# Patient Record
Sex: FEMALE | Race: BLACK OR AFRICAN AMERICAN | NOT HISPANIC OR LATINO | Employment: FULL TIME | URBAN - METROPOLITAN AREA
[De-identification: names, ages, dates, MRNs, and addresses within clinical notes are randomized per-mention and may not be internally consistent; named-entity substitution may affect disease eponyms.]

---

## 2023-10-19 PROBLEM — R87.610 ATYPICAL SQUAMOUS CELLS OF UNDETERMINED SIGNIFICANCE ON CYTOLOGIC SMEAR OF CERVIX (ASC-US): Status: ACTIVE | Noted: 2023-10-19

## 2023-10-19 PROBLEM — J45.998 POST VIRAL RAD (REACTIVE AIRWAY DISEASE) (HHS-HCC): Status: ACTIVE | Noted: 2023-10-19

## 2023-10-19 PROBLEM — E66.3 OVERWEIGHT: Status: ACTIVE | Noted: 2023-10-19

## 2023-10-19 PROBLEM — R05.3 CHRONIC COUGH: Status: ACTIVE | Noted: 2023-10-19

## 2023-10-19 PROBLEM — E55.9 VITAMIN D DEFICIENCY: Status: ACTIVE | Noted: 2023-10-19

## 2023-10-19 RX ORDER — ALBUTEROL SULFATE 90 UG/1
2 AEROSOL, METERED RESPIRATORY (INHALATION) 4 TIMES DAILY PRN
COMMUNITY
Start: 2022-01-04

## 2023-10-19 RX ORDER — MEDROXYPROGESTERONE ACETATE 150 MG/ML
INJECTION, SUSPENSION INTRAMUSCULAR
COMMUNITY
Start: 2021-10-01

## 2023-10-19 RX ORDER — CETIRIZINE HYDROCHLORIDE 10 MG/1
1 TABLET ORAL DAILY
COMMUNITY
Start: 2022-01-26

## 2023-10-19 RX ORDER — BENZONATATE 100 MG/1
1 CAPSULE ORAL 3 TIMES DAILY PRN
COMMUNITY
Start: 2022-01-26 | End: 2024-04-05 | Stop reason: ALTCHOICE

## 2023-10-20 ENCOUNTER — APPOINTMENT (OUTPATIENT)
Dept: OBSTETRICS AND GYNECOLOGY | Facility: HOSPITAL | Age: 43
End: 2023-10-20
Payer: COMMERCIAL

## 2023-10-20 ENCOUNTER — CLINICAL SUPPORT (OUTPATIENT)
Dept: PRIMARY CARE | Facility: CLINIC | Age: 43
End: 2023-10-20
Payer: COMMERCIAL

## 2023-10-20 VITALS
OXYGEN SATURATION: 99 % | DIASTOLIC BLOOD PRESSURE: 83 MMHG | SYSTOLIC BLOOD PRESSURE: 118 MMHG | TEMPERATURE: 98.3 F | HEART RATE: 79 BPM

## 2023-10-20 DIAGNOSIS — Z30.8 ENCOUNTER FOR OTHER CONTRACEPTIVE MANAGEMENT: Primary | ICD-10-CM

## 2023-10-20 PROCEDURE — 96372 THER/PROPH/DIAG INJ SC/IM: CPT | Performed by: FAMILY MEDICINE

## 2023-10-20 RX ORDER — MEDROXYPROGESTERONE ACETATE 150 MG/ML
150 INJECTION, SUSPENSION INTRAMUSCULAR ONCE
Status: COMPLETED | OUTPATIENT
Start: 2023-10-20 | End: 2023-10-20

## 2023-10-20 RX ADMIN — MEDROXYPROGESTERONE ACETATE 150 MG: 150 INJECTION, SUSPENSION INTRAMUSCULAR at 12:41

## 2023-10-20 NOTE — PROGRESS NOTES
Pt in clinic to receive Depot injection. V/S WNL. Calendar given to pt for next administration window. Administered to L deltoid. Appt with provider scheduled with pt prior to appt end.

## 2023-10-23 ENCOUNTER — APPOINTMENT (OUTPATIENT)
Dept: PRIMARY CARE | Facility: CLINIC | Age: 43
End: 2023-10-23
Payer: COMMERCIAL

## 2024-01-05 ENCOUNTER — OFFICE VISIT (OUTPATIENT)
Dept: PRIMARY CARE | Facility: CLINIC | Age: 44
End: 2024-01-05
Payer: COMMERCIAL

## 2024-01-05 VITALS
OXYGEN SATURATION: 98 % | TEMPERATURE: 97.5 F | HEART RATE: 96 BPM | DIASTOLIC BLOOD PRESSURE: 78 MMHG | BODY MASS INDEX: 31.97 KG/M2 | WEIGHT: 204.1 LBS | SYSTOLIC BLOOD PRESSURE: 108 MMHG

## 2024-01-05 DIAGNOSIS — Z30.42 ENCOUNTER FOR SURVEILLANCE OF INJECTABLE CONTRACEPTIVE: ICD-10-CM

## 2024-01-05 DIAGNOSIS — Z28.21 INFLUENZA VACCINE REFUSED: ICD-10-CM

## 2024-01-05 DIAGNOSIS — Z12.31 ENCOUNTER FOR SCREENING MAMMOGRAM FOR MALIGNANT NEOPLASM OF BREAST: Primary | ICD-10-CM

## 2024-01-05 DIAGNOSIS — Z00.00 ANNUAL PHYSICAL EXAM: ICD-10-CM

## 2024-01-05 PROBLEM — E66.811 CLASS 1 OBESITY DUE TO EXCESS CALORIES WITHOUT SERIOUS COMORBIDITY WITH BODY MASS INDEX (BMI) OF 31.0 TO 31.9 IN ADULT: Status: ACTIVE | Noted: 2023-10-19

## 2024-01-05 PROBLEM — E66.09 CLASS 1 OBESITY DUE TO EXCESS CALORIES WITHOUT SERIOUS COMORBIDITY WITH BODY MASS INDEX (BMI) OF 31.0 TO 31.9 IN ADULT: Status: ACTIVE | Noted: 2023-10-19

## 2024-01-05 LAB — PREGNANCY TEST URINE, POC: NEGATIVE

## 2024-01-05 PROCEDURE — 96372 THER/PROPH/DIAG INJ SC/IM: CPT | Performed by: STUDENT IN AN ORGANIZED HEALTH CARE EDUCATION/TRAINING PROGRAM

## 2024-01-05 PROCEDURE — 99396 PREV VISIT EST AGE 40-64: CPT | Performed by: STUDENT IN AN ORGANIZED HEALTH CARE EDUCATION/TRAINING PROGRAM

## 2024-01-05 PROCEDURE — 90471 IMMUNIZATION ADMIN: CPT | Performed by: STUDENT IN AN ORGANIZED HEALTH CARE EDUCATION/TRAINING PROGRAM

## 2024-01-05 PROCEDURE — 90715 TDAP VACCINE 7 YRS/> IM: CPT | Performed by: STUDENT IN AN ORGANIZED HEALTH CARE EDUCATION/TRAINING PROGRAM

## 2024-01-05 PROCEDURE — 1036F TOBACCO NON-USER: CPT | Performed by: STUDENT IN AN ORGANIZED HEALTH CARE EDUCATION/TRAINING PROGRAM

## 2024-01-05 PROCEDURE — 81025 URINE PREGNANCY TEST: CPT | Performed by: STUDENT IN AN ORGANIZED HEALTH CARE EDUCATION/TRAINING PROGRAM

## 2024-01-05 RX ORDER — MEDROXYPROGESTERONE ACETATE 150 MG/ML
150 INJECTION, SUSPENSION INTRAMUSCULAR ONCE
Status: COMPLETED | OUTPATIENT
Start: 2024-01-05 | End: 2024-01-05

## 2024-01-05 RX ADMIN — MEDROXYPROGESTERONE ACETATE 150 MG: 150 INJECTION, SUSPENSION INTRAMUSCULAR at 16:56

## 2024-01-05 SDOH — ECONOMIC STABILITY: FOOD INSECURITY: WITHIN THE PAST 12 MONTHS, THE FOOD YOU BOUGHT JUST DIDN'T LAST AND YOU DIDN'T HAVE MONEY TO GET MORE.: NEVER TRUE

## 2024-01-05 SDOH — ECONOMIC STABILITY: FOOD INSECURITY: WITHIN THE PAST 12 MONTHS, YOU WORRIED THAT YOUR FOOD WOULD RUN OUT BEFORE YOU GOT MONEY TO BUY MORE.: NEVER TRUE

## 2024-01-05 ASSESSMENT — PATIENT HEALTH QUESTIONNAIRE - PHQ9
2. FEELING DOWN, DEPRESSED OR HOPELESS: NOT AT ALL
1. LITTLE INTEREST OR PLEASURE IN DOING THINGS: NOT AT ALL
SUM OF ALL RESPONSES TO PHQ9 QUESTIONS 1 AND 2: 0

## 2024-01-05 ASSESSMENT — PAIN SCALES - GENERAL: PAINLEVEL: 0-NO PAIN

## 2024-01-05 NOTE — PATIENT INSTRUCTIONS
Dear Rosenda Gaspar,     It was a pleasure getting to manage your care with you today.     Please bring a copy of you [facility] lab results to your next visit.    Diet tips:   1. Eat healthy sources of carbohydrates (whole grains, like brown rice and whole oats; fruit; vegetables; beans and lentils; dairy, like unsweetened yogurt and milk.)   2. Cut down on high carbohydrate, low fiber foods, such as white bread, white rice, and highly processed cereals.   3. Eat less salt.   4. Eat less red and processed meat.   5. Choose healthier fats (unsalted nuts, seeds, avocados, fish, olive oil).   6. Cut down on added sugar (pop, fruit juice, energy drinks).   7. Be smart with snacks. Healthy snacks include: yogurt, nuts, seeds, fruits, vegetables  8. Drink alcohol as little as possible, and no more than 7 drinks in a week. It's not healthy at any amount.     Exercise: Aim for at least 150 minutes of moderate intensity activity per week (30 minutes, 5 times per week). This includes any activity that raises your heart rate, makes you breath faster, and feel warmer. You should be able to talk and only be slightly out of breath.     Follow up Appointment: 3 months for pap and next Depo    Emergency  In the case of an emergency please call 911 or visit the Emergency Department immediately for evaluation.     We look forward to continuing your care here at our Clinic. Take Care.     Sincerely,   Kali Douglas MD

## 2024-01-05 NOTE — PROGRESS NOTES
Subjective   Patient ID: Rosenda Gaspar is a 43 y.o. female with no significant PMH who presents for Contraception. It has been 1 year since last physical, so visit was converted into annual physical visit.    # Health Maintenance  - [facility]  - Patient's rating of their own health: Good  - Dental Care: last prior dental visit - about 1 year ago // brushes teeth - yes // denies current tooth pain  - Vision: last prior ophtho visit - March 2023 // corrective devices: glasses // recent vision: no concerns  - Hearing: denies recent hearing loss  - Diet: thinks it's pretty healthy  - Exercise: going to a gym, treadmill  - Weight: stable  - Smoking: never a smoker  - EtOH: Alcohol Use: Yes, patient drinks alcohol. Frequency: rarely, socially. Amount: 2 cups in a month.  - Illicit substances: denied.  - Employment: [employer]  - Living Situation: Single in her own house  - Colon CA: family h/o colon ca? No    WOMEN  - Menstrual Status: Does not get periods while on Depo  - LMP whenever she missed Depo last  - Pregnancy history: G2  - Sexual History: Sexually active with 1 male partner  - Prior forms of contraception tried: DepoProvera  - Content with current contraception? Yes; has been on it for 25 years and stable  - Sexual dysfunction? No  - Bladder dysfunction? has not had any episodes of incontinence  - Cervical CA: last prior pap 5/15/2019 // h/o abnormal pap? Yes, describe: HPV negative with ASCUS  - Breast CA: last prior mammo never // mother has a history of breast cancer diagnosed at age 48    PHQ2:  Over the past 2 weeks, how often have you been bothered by any of the following problems?  Little interest or pleasure in doing things: Not at all  Feeling down, depressed, or hopeless: Not at all    Food insecurity:  Food Insecurity: No Food Insecurity (1/5/2024)    Hunger Vital Sign     Worried About Running Out of Food in the Last Year: Never true     Ran Out of Food in the  Last Year: Never true     Review of Systems  As above in HPI    Objective   /78 (BP Location: Right arm, Patient Position: Sitting, BP Cuff Size: Adult)   Pulse 96   Temp 36.4 °C (97.5 °F) (Temporal)   Wt 92.6 kg (204 lb 1.6 oz)   SpO2 98%   BMI 31.97 kg/m²  Body mass index is 31.97 kg/m².    Physical Exam  Vitals reviewed.   Constitutional:       General: She is not in acute distress.     Appearance: Normal appearance.   HENT:      Head: Normocephalic and atraumatic.   Eyes:      Conjunctiva/sclera: Conjunctivae normal.   Pulmonary:      Effort: Pulmonary effort is normal. No respiratory distress.   Skin:     General: Skin is warm and dry.   Neurological:      General: No focal deficit present.      Mental Status: She is alert. Mental status is at baseline.   Psychiatric:         Behavior: Behavior normal.       Assessment/Plan   Rosenda Gaspar is a 44yo female with no PMH who presents for annual physical and contraception management.    Problem List Items Addressed This Visit             ICD-10-CM    Annual physical exam Z00.00     - Flu vaccine: recommended annually, declined  - COVID vaccine: recommended completion of primary series and recommended boosters  - Tdap: done today  - STI screen: not indicated based on risk factors  - Lifetime HIV, HepC: deferred  - Lipid Panel (35M,45F): done for work; will bring in copy of results to next visit  - DM screening: done for work, will bring in copy of results to next visit  - HTN screening: wnl today  - Food Insecurity screen: negative  - Depression: PHQ-2 negative  - Tobacco Cessation: N/A  - Last Dental: recommended follow up every 6mo  - Last Eye exam: recommended follow up annually  - Pap smear (21-65): overdue d/t hx of ASCUS on pap from 2019; will schedule pap for follow up visit  - Breast CA screening: ordered today           Other Visit Diagnoses         Codes    Encounter for screening mammogram for malignant neoplasm of breast    -  Primary Z12.31     Relevant Orders    BI mammo bilateral screening tomosynthesis    Encounter for surveillance of injectable contraceptive     Z30.42    Relevant Medications    medroxyPROGESTERone (Depo-Provera) injection 150 mg (Completed)    Other Relevant Orders    POCT Pregnancy, Urine manually resulted (Completed)    Influenza vaccine refused     Z28.21        Follow up in 3 months for next Depo and pap test.    Patient discussed with Dr. Tomlinson.    Kali Douglas MD   PGY-3

## 2024-01-06 NOTE — ASSESSMENT & PLAN NOTE
- Flu vaccine: recommended annually, declined  - COVID vaccine: recommended completion of primary series and recommended boosters  - Tdap: done today  - STI screen: not indicated based on risk factors  - Lifetime HIV, HepC: deferred  - Lipid Panel (35M,45F): done for work; will bring in copy of results to next visit  - DM screening: done for work, will bring in copy of results to next visit  - HTN screening: wnl today  - Food Insecurity screen: negative  - Depression: PHQ-2 negative  - Tobacco Cessation: N/A  - Last Dental: recommended follow up every 6mo  - Last Eye exam: recommended follow up annually  - Pap smear (21-65): overdue d/t hx of ASCUS on pap from 2019; will schedule pap for follow up visit  - Breast CA screening: ordered today

## 2024-01-08 NOTE — PROGRESS NOTES
I reviewed the resident/fellow's documentation and discussed the patient with the resident/fellow. I agree with the resident/fellow's medical decision making as documented in the note.    Elpidio Tomlinson MD

## 2024-01-12 ENCOUNTER — APPOINTMENT (OUTPATIENT)
Dept: PRIMARY CARE | Facility: CLINIC | Age: 44
End: 2024-01-12
Payer: COMMERCIAL

## 2024-04-05 ENCOUNTER — OFFICE VISIT (OUTPATIENT)
Dept: PRIMARY CARE | Facility: CLINIC | Age: 44
End: 2024-04-05
Payer: COMMERCIAL

## 2024-04-05 VITALS
SYSTOLIC BLOOD PRESSURE: 106 MMHG | BODY MASS INDEX: 31.18 KG/M2 | OXYGEN SATURATION: 100 % | DIASTOLIC BLOOD PRESSURE: 73 MMHG | TEMPERATURE: 97 F | WEIGHT: 194 LBS | HEART RATE: 84 BPM | HEIGHT: 66 IN

## 2024-04-05 DIAGNOSIS — R87.610 ATYPICAL SQUAMOUS CELLS OF UNDETERMINED SIGNIFICANCE ON CYTOLOGIC SMEAR OF CERVIX (ASC-US): Primary | ICD-10-CM

## 2024-04-05 DIAGNOSIS — Z30.8 ENCOUNTER FOR OTHER CONTRACEPTIVE MANAGEMENT: ICD-10-CM

## 2024-04-05 PROCEDURE — 88141 CYTOPATH C/V INTERPRET: CPT | Performed by: PATHOLOGY

## 2024-04-05 PROCEDURE — 87624 HPV HI-RISK TYP POOLED RSLT: CPT

## 2024-04-05 PROCEDURE — 88175 CYTOPATH C/V AUTO FLUID REDO: CPT

## 2024-04-05 PROCEDURE — 96372 THER/PROPH/DIAG INJ SC/IM: CPT | Performed by: STUDENT IN AN ORGANIZED HEALTH CARE EDUCATION/TRAINING PROGRAM

## 2024-04-05 PROCEDURE — 99213 OFFICE O/P EST LOW 20 MIN: CPT | Performed by: STUDENT IN AN ORGANIZED HEALTH CARE EDUCATION/TRAINING PROGRAM

## 2024-04-05 RX ORDER — CALCIUM CARBONATE 500(1250)
1 TABLET,CHEWABLE ORAL DAILY
COMMUNITY

## 2024-04-05 RX ORDER — MEDROXYPROGESTERONE ACETATE 150 MG/ML
150 INJECTION, SUSPENSION INTRAMUSCULAR ONCE
Status: COMPLETED | OUTPATIENT
Start: 2024-04-05 | End: 2024-04-05

## 2024-04-05 RX ORDER — FOLIC ACID 0.4 MG
TABLET ORAL EVERY OTHER DAY
COMMUNITY

## 2024-04-05 RX ORDER — CHOLECALCIFEROL (VITAMIN D3) 25 MCG
1000 TABLET ORAL DAILY
COMMUNITY

## 2024-04-05 RX ADMIN — MEDROXYPROGESTERONE ACETATE 150 MG: 150 INJECTION, SUSPENSION INTRAMUSCULAR at 16:51

## 2024-04-05 ASSESSMENT — PAIN SCALES - GENERAL: PAINLEVEL: 0-NO PAIN

## 2024-04-05 NOTE — PATIENT INSTRUCTIONS
Dear Ms. Hubert,    It was a pleasure getting to manage your care with you today.     Follow up Appointment: 3 months for Depo and 9 months for annual physical    Emergency  In the case of an emergency please call 911 or visit the Emergency Department immediately for evaluation.     We look forward to continuing your care here at our Clinic. Take Care.     Sincerely,   Kali Douglas MD

## 2024-04-05 NOTE — PROGRESS NOTES
"Subjective   Patient ID: Rosenda Gaspar is a 44 y.o. female who presents for follow up visit and for pap test.    Patient had annual exam last visit. Denies any new concerns at this time. Is overdue for pap test due to history of ASCUS on prior pap. She is agreeable with plan to complete pap today. Also here for Depo follow up. Was restarted on Depo last visit 3 months ago and had a negative pregnancy test at that time.      Review of Systems  As above in HPI    Objective   /73 (BP Location: Left arm, Patient Position: Sitting, BP Cuff Size: Adult)   Pulse 84   Temp 36.1 °C (97 °F) (Temporal)   Ht 1.676 m (5' 6\")   Wt 88 kg (194 lb)   SpO2 100%   BMI 31.31 kg/m²  Body mass index is 31.31 kg/m².    Physical Exam  Vitals reviewed. Exam conducted with a chaperone present.   Constitutional:       General: She is not in acute distress.     Appearance: Normal appearance.   HENT:      Head: Normocephalic and atraumatic.   Eyes:      Conjunctiva/sclera: Conjunctivae normal.   Pulmonary:      Effort: Pulmonary effort is normal. No respiratory distress.   Genitourinary:     Labia:         Right: No lesion.         Left: No lesion.       Vagina: Vaginal discharge (physiologic) present.      Cervix: No discharge, lesion, erythema or cervical bleeding.   Skin:     General: Skin is warm and dry.   Neurological:      Mental Status: She is alert. Mental status is at baseline.   Psychiatric:         Behavior: Behavior normal.       Assessment/Plan   Rosenda Gaspar is a 45yo female who presents for follow up visit for pap and Depo.    Problem List Items Addressed This Visit             ICD-10-CM    Atypical squamous cells of undetermined significance on cytologic smear of cervix (ASC-US) - Primary R87.610    Relevant Orders    THINPREP PAP TEST    HPV DNA High Risk With Genotype     Other Visit Diagnoses         Codes    Encounter for other contraceptive management     Z30.8    Relevant Medications    " medroxyPROGESTERone (Depo-Provera) injection 150 mg (Completed)        Follow up in 3 months for next Depo at nurse visit and in 9 months for annual physical.    Patient discussed with Dr. Tomlinson.    Kali Douglas MD   PGY-3

## 2024-04-08 NOTE — ASSESSMENT & PLAN NOTE
-history of HPV positive ASCUS pap in 2019 without follow up  -pap in 2016 w/ LSIL, no associated HPV testing  -normal pap in 2013  -repeat pap today

## 2024-04-17 LAB
CYTOLOGY CMNT CVX/VAG CYTO-IMP: NORMAL
HPV HR 12 DNA GENITAL QL NAA+PROBE: NEGATIVE
HPV HR GENOTYPES PNL CVX NAA+PROBE: NEGATIVE
HPV16 DNA SPEC QL NAA+PROBE: NEGATIVE
HPV18 DNA SPEC QL NAA+PROBE: NEGATIVE
LAB AP HPV GENOTYPE QUESTION: YES
LAB AP HPV HR: NORMAL
LABORATORY COMMENT REPORT: NORMAL
PATH REPORT.TOTAL CANCER: NORMAL

## 2024-04-22 DIAGNOSIS — R87.610 ATYPICAL SQUAMOUS CELLS OF UNDETERMINED SIGNIFICANCE ON CYTOLOGIC SMEAR OF CERVIX (ASC-US): Primary | ICD-10-CM

## 2024-07-02 ENCOUNTER — APPOINTMENT (OUTPATIENT)
Dept: PRIMARY CARE | Facility: CLINIC | Age: 44
End: 2024-07-02
Payer: COMMERCIAL

## 2024-07-02 VITALS — SYSTOLIC BLOOD PRESSURE: 112 MMHG | OXYGEN SATURATION: 98 % | DIASTOLIC BLOOD PRESSURE: 75 MMHG | HEART RATE: 80 BPM

## 2024-07-02 DIAGNOSIS — Z30.49 ENCOUNTER FOR SURVEILLANCE OF OTHER CONTRACEPTIVE: Primary | ICD-10-CM

## 2024-07-02 PROCEDURE — 96372 THER/PROPH/DIAG INJ SC/IM: CPT | Performed by: STUDENT IN AN ORGANIZED HEALTH CARE EDUCATION/TRAINING PROGRAM

## 2024-07-02 RX ORDER — MEDROXYPROGESTERONE ACETATE 150 MG/ML
150 INJECTION, SUSPENSION INTRAMUSCULAR ONCE
Status: COMPLETED | OUTPATIENT
Start: 2024-07-02 | End: 2024-07-02

## 2024-07-29 ENCOUNTER — APPOINTMENT (OUTPATIENT)
Dept: PRIMARY CARE | Facility: CLINIC | Age: 44
End: 2024-07-29
Payer: COMMERCIAL

## 2024-07-29 VITALS
BODY MASS INDEX: 30.31 KG/M2 | TEMPERATURE: 97.6 F | OXYGEN SATURATION: 98 % | HEART RATE: 94 BPM | SYSTOLIC BLOOD PRESSURE: 111 MMHG | WEIGHT: 188.6 LBS | HEIGHT: 66 IN | DIASTOLIC BLOOD PRESSURE: 78 MMHG

## 2024-07-29 DIAGNOSIS — R07.9 CHEST PAIN, UNSPECIFIED TYPE: Primary | ICD-10-CM

## 2024-07-29 ASSESSMENT — ENCOUNTER SYMPTOMS
JOINT SWELLING: 0
EYE DISCHARGE: 0
EYE ITCHING: 0
DIFFICULTY URINATING: 0
EYE REDNESS: 0
UNEXPECTED WEIGHT CHANGE: 0
ABDOMINAL DISTENTION: 0
POLYDIPSIA: 0
SHORTNESS OF BREATH: 0
DEPRESSION: 0
SINUS PRESSURE: 0
HEADACHES: 0
FATIGUE: 0
CONFUSION: 0
COUGH: 0
POLYPHAGIA: 0
BLOOD IN STOOL: 0
ADENOPATHY: 0
EYE PAIN: 0
DIZZINESS: 0
ARTHRALGIAS: 0
LIGHT-HEADEDNESS: 0
OCCASIONAL FEELINGS OF UNSTEADINESS: 0
FEVER: 0
DIARRHEA: 0
AGITATION: 0
LOSS OF SENSATION IN FEET: 0
DIAPHORESIS: 0
FLANK PAIN: 0
DYSURIA: 0
CONSTIPATION: 0
PALPITATIONS: 0
ABDOMINAL PAIN: 0
BACK PAIN: 0
CHEST TIGHTNESS: 0
CHILLS: 0

## 2024-07-29 ASSESSMENT — COLUMBIA-SUICIDE SEVERITY RATING SCALE - C-SSRS
6. HAVE YOU EVER DONE ANYTHING, STARTED TO DO ANYTHING, OR PREPARED TO DO ANYTHING TO END YOUR LIFE?: NO
2. HAVE YOU ACTUALLY HAD ANY THOUGHTS OF KILLING YOURSELF?: NO
1. IN THE PAST MONTH, HAVE YOU WISHED YOU WERE DEAD OR WISHED YOU COULD GO TO SLEEP AND NOT WAKE UP?: NO

## 2024-07-29 ASSESSMENT — PATIENT HEALTH QUESTIONNAIRE - PHQ9
1. LITTLE INTEREST OR PLEASURE IN DOING THINGS: NOT AT ALL
2. FEELING DOWN, DEPRESSED OR HOPELESS: NOT AT ALL
SUM OF ALL RESPONSES TO PHQ9 QUESTIONS 1 AND 2: 0

## 2024-07-29 ASSESSMENT — PAIN SCALES - GENERAL: PAINLEVEL: 0-NO PAIN

## 2024-07-29 NOTE — PROGRESS NOTES
Subjective   Patient ID: Rosenda Gaspar is a 44 y.o. female who presents for Follow-up (Pt wants to know if she needs to see heart doc).    Chest Pain   This is a recurrent problem. The current episode started more than 1 month ago. The onset quality is sudden. The problem has been resolved. The pain is present in the substernal region. The pain is at a severity of 9/10. The pain is severe. The quality of the pain is described as sharp. The pain radiates to the lower back (left back). Pertinent negatives include no abdominal pain, back pain, cough, diaphoresis, dizziness, fever, headaches, palpitations or shortness of breath. The pain is aggravated by emotional upset, breathing and exertion. She has tried rest (Baby aspirin) for the symptoms. The treatment provided significant relief. Risk factors include stress.      July 9th chest discomfort started. Had chicken pox July 11. The pain got worse. The pain comes and goes. Pain described as sharp and persistent. It lasted a week. She took aspirin 81 mg daily for 5 days helped. This chest pain was first there 4 months ago and lasted 4 days. Did not treat it at that time and it resolved. This chest pain was worse at night. Describes the pain as sharp pain, pulsating. No pain today. Breathing in hurts. She was given Valtrex for her recent diagnose of chicken pox. She only had itching. Her mother had Shingles 3 weeks before. She couldn't lay on left side. Had dizziness when it occurred. Denies lifting weights or moving heavy boxes. Tried Gas-X, laxative and Doculax thought she was bloated and that it might help. Thinks stress plays a role. Is a  for insurance company.     FH: Mother- pacemaker weak heart ; h/o breast cancer   Social: Exercises 2x per week on treadmill (walks);  on Depo     Review of Systems   Constitutional:  Negative for chills, diaphoresis, fatigue, fever and unexpected weight change.   HENT:  Negative for congestion, ear pain and sinus  "pressure.    Eyes:  Negative for pain, discharge, redness and itching.   Respiratory:  Negative for cough, chest tightness and shortness of breath.    Cardiovascular:  Negative for chest pain, palpitations and leg swelling.   Gastrointestinal:  Negative for abdominal distention, abdominal pain, blood in stool, constipation and diarrhea.   Endocrine: Negative for cold intolerance, heat intolerance, polydipsia and polyphagia.   Genitourinary:  Negative for difficulty urinating, dysuria, flank pain, pelvic pain and urgency.   Musculoskeletal:  Negative for arthralgias, back pain and joint swelling.   Neurological:  Negative for dizziness, syncope, light-headedness and headaches.   Hematological:  Negative for adenopathy.   Psychiatric/Behavioral:  Negative for agitation, behavioral problems and confusion.        Objective   /78 (BP Location: Right arm, Patient Position: Sitting, BP Cuff Size: Adult)   Pulse 94   Temp 36.4 °C (97.6 °F) (Tympanic)   Ht 1.676 m (5' 6\")   Wt 85.5 kg (188 lb 9.6 oz)   SpO2 98%   BMI 30.44 kg/m²     Physical Exam  Vitals and nursing note reviewed.   Constitutional:       General: She is not in acute distress.     Appearance: Normal appearance.   HENT:      Head: Normocephalic and atraumatic.      Nose: Nose normal.      Mouth/Throat:      Mouth: Mucous membranes are moist.   Eyes:      Extraocular Movements: Extraocular movements intact.      Conjunctiva/sclera: Conjunctivae normal.   Cardiovascular:      Rate and Rhythm: Normal rate and regular rhythm.      Heart sounds: Normal heart sounds.      No friction rub.   Pulmonary:      Effort: Pulmonary effort is normal.      Breath sounds: Normal breath sounds.   Abdominal:      General: Bowel sounds are normal. There is no distension.      Palpations: Abdomen is soft.      Tenderness: There is no abdominal tenderness.   Musculoskeletal:         General: Normal range of motion.      Cervical back: Normal range of motion.      Right " lower leg: No edema.      Left lower leg: No edema.   Skin:     General: Skin is warm.      Comments: Healing pustules noted Left anterior leg. No drainage.    Neurological:      General: No focal deficit present.      Mental Status: She is alert and oriented to person, place, and time. Mental status is at baseline.   Psychiatric:         Mood and Affect: Mood normal.         Behavior: Behavior normal.         Thought Content: Thought content normal.         Judgment: Judgment normal.         Assessment/Plan     Rosenda Gaspar is a 44 y.o. female who presents for concern chest pain.  EKG performed in clinic showed NSR which is reassuring. No chest pain today and physical exam unremarkable. No tobacco use and no cardiac risk factors. Etiology of chest pain most likely could have been to due to ?Viral pericarditis secondary to chicken pox. Also on ddx but, less likely because of signs/symptoms and physical exam include costochondritis vs costal muscle injury vs bronchospasm. Plan as follows:    Diagnoses and all orders for this visit:  Chest pain, unspecified type (likely due to viral pericarditis)  -     ECG 12 Lead performed in clinic. NSR   -     Transthoracic Echo (TTE) Complete; Future   Return to clinic         -     In 3 months for follow up chest pain/Echo and/or sooner if needed.    Patient seen and discussed with attending, Dr. Pan.    Nereyda Medellin MD  PGY2 Family Medicine

## 2024-08-01 ENCOUNTER — DOCUMENTATION (OUTPATIENT)
Dept: PRIMARY CARE | Facility: CLINIC | Age: 44
End: 2024-08-01
Payer: COMMERCIAL

## 2024-08-04 NOTE — PROGRESS NOTES
I saw and evaluated the patient. I personally obtained the key and critical portions of the history and physical exam or was physically present for key and critical portions performed by the resident/fellow. I reviewed the resident/fellow's documentation and discussed the patient with the resident/fellow. I agree with the resident/fellow's medical decision making as documented in the note.    Chel Pan MD

## 2024-08-16 ENCOUNTER — HOSPITAL ENCOUNTER (OUTPATIENT)
Dept: CARDIOLOGY | Facility: HOSPITAL | Age: 44
Discharge: HOME | End: 2024-08-16
Payer: COMMERCIAL

## 2024-08-16 DIAGNOSIS — R07.9 CHEST PAIN, UNSPECIFIED TYPE: ICD-10-CM

## 2024-08-16 LAB
AORTIC VALVE MEAN GRADIENT: 3.8 MMHG
AORTIC VALVE PEAK VELOCITY: 1.44 M/S
AV PEAK GRADIENT: 8.3 MMHG
EJECTION FRACTION APICAL 4 CHAMBER: 69.5
EJECTION FRACTION: 64 %
LEFT ATRIUM VOLUME AREA LENGTH INDEX BSA: 24.3 ML/M2
LEFT VENTRICLE INTERNAL DIMENSION DIASTOLE: 4.84 CM (ref 3.5–6)
MITRAL VALVE E/A RATIO: 0.78
RIGHT VENTRICLE FREE WALL PEAK S': 12 CM/S
RIGHT VENTRICLE PEAK SYSTOLIC PRESSURE: 26.4 MMHG
TRICUSPID ANNULAR PLANE SYSTOLIC EXCURSION: 1.9 CM

## 2024-08-16 PROCEDURE — 93306 TTE W/DOPPLER COMPLETE: CPT

## 2024-08-16 PROCEDURE — 93306 TTE W/DOPPLER COMPLETE: CPT | Performed by: INTERNAL MEDICINE

## 2024-09-20 ENCOUNTER — APPOINTMENT (OUTPATIENT)
Dept: PRIMARY CARE | Facility: CLINIC | Age: 44
End: 2024-09-20
Payer: COMMERCIAL

## 2024-09-20 VITALS — DIASTOLIC BLOOD PRESSURE: 78 MMHG | SYSTOLIC BLOOD PRESSURE: 112 MMHG | HEART RATE: 94 BPM

## 2024-09-20 DIAGNOSIS — Z30.8 ENCOUNTER FOR OTHER CONTRACEPTIVE MANAGEMENT: Primary | ICD-10-CM

## 2024-09-20 RX ORDER — MEDROXYPROGESTERONE ACETATE 150 MG/ML
150 INJECTION, SUSPENSION INTRAMUSCULAR ONCE
Status: COMPLETED | OUTPATIENT
Start: 2024-09-20 | End: 2024-09-20

## 2024-12-13 ENCOUNTER — APPOINTMENT (OUTPATIENT)
Dept: PRIMARY CARE | Facility: CLINIC | Age: 44
End: 2024-12-13
Payer: COMMERCIAL

## 2024-12-13 VITALS — DIASTOLIC BLOOD PRESSURE: 72 MMHG | HEART RATE: 80 BPM | SYSTOLIC BLOOD PRESSURE: 109 MMHG

## 2024-12-13 DIAGNOSIS — Z30.8 ENCOUNTER FOR OTHER CONTRACEPTIVE MANAGEMENT: ICD-10-CM

## 2024-12-13 DIAGNOSIS — Z30.42 ENCOUNTER FOR SURVEILLANCE OF INJECTABLE CONTRACEPTIVE: Primary | ICD-10-CM

## 2024-12-13 RX ORDER — MEDROXYPROGESTERONE ACETATE 150 MG/ML
150 INJECTION, SUSPENSION INTRAMUSCULAR ONCE
Status: COMPLETED | OUTPATIENT
Start: 2024-12-13 | End: 2024-12-13

## 2024-12-13 NOTE — PROGRESS NOTES
Pt in clinic for depo. BP WNL. Calendar given for next administration. Administered to deltoid without event via MA.

## 2025-02-28 ENCOUNTER — CLINICAL SUPPORT (OUTPATIENT)
Facility: HOSPITAL | Age: 45
End: 2025-02-28
Payer: COMMERCIAL

## 2025-02-28 VITALS — HEART RATE: 89 BPM | DIASTOLIC BLOOD PRESSURE: 82 MMHG | SYSTOLIC BLOOD PRESSURE: 116 MMHG

## 2025-02-28 DIAGNOSIS — Z30.42 DEPO-PROVERA CONTRACEPTIVE STATUS: Primary | ICD-10-CM

## 2025-02-28 PROCEDURE — 2500000004 HC RX 250 GENERAL PHARMACY W/ HCPCS (ALT 636 FOR OP/ED): Performed by: FAMILY MEDICINE

## 2025-02-28 PROCEDURE — 96372 THER/PROPH/DIAG INJ SC/IM: CPT | Performed by: FAMILY MEDICINE

## 2025-02-28 RX ORDER — MEDROXYPROGESTERONE ACETATE 150 MG/ML
150 INJECTION, SUSPENSION INTRAMUSCULAR ONCE
Status: COMPLETED | OUTPATIENT
Start: 2025-02-28 | End: 2025-02-28

## 2025-02-28 RX ADMIN — MEDROXYPROGESTERONE ACETATE 150 MG: 150 INJECTION, SUSPENSION INTRAMUSCULAR at 16:34

## 2025-02-28 NOTE — PROGRESS NOTES
Pt is here for a depot injection.  Last depot was 12/13/2024 last pregnancy test was negative on 2024.pt was given calendar for next appt

## 2025-02-28 NOTE — PROGRESS NOTES
Pt in clinic for depo. BP was normal. Calendar given for next administration. Administered to left deltoid

## 2025-05-21 ENCOUNTER — TELEPHONE (OUTPATIENT)
Facility: CLINIC | Age: 45
End: 2025-05-21
Payer: COMMERCIAL

## 2025-05-21 NOTE — TELEPHONE ENCOUNTER
This nurse noted pt scheduled for Depo FUV this Friday and is past due for provider annual Depo F/U. Appt changed to provider visit, same date and time. Call placed to pt to make aware. Pt confirms understanding.

## 2025-05-23 ENCOUNTER — TELEPHONE (OUTPATIENT)
Facility: HOSPITAL | Age: 45
End: 2025-05-23

## 2025-05-23 ENCOUNTER — OFFICE VISIT (OUTPATIENT)
Facility: HOSPITAL | Age: 45
End: 2025-05-23
Payer: COMMERCIAL

## 2025-05-23 VITALS
WEIGHT: 200 LBS | BODY MASS INDEX: 31.39 KG/M2 | TEMPERATURE: 97.6 F | HEIGHT: 67 IN | OXYGEN SATURATION: 99 % | DIASTOLIC BLOOD PRESSURE: 71 MMHG | RESPIRATION RATE: 16 BRPM | HEART RATE: 86 BPM | SYSTOLIC BLOOD PRESSURE: 102 MMHG

## 2025-05-23 DIAGNOSIS — Z30.8 ENCOUNTER FOR OTHER CONTRACEPTIVE MANAGEMENT: Primary | ICD-10-CM

## 2025-05-23 DIAGNOSIS — J45.998 OTHER ASTHMA: ICD-10-CM

## 2025-05-23 DIAGNOSIS — J45.909 UNCOMPLICATED ASTHMA, UNSPECIFIED ASTHMA SEVERITY, UNSPECIFIED WHETHER PERSISTENT (HHS-HCC): ICD-10-CM

## 2025-05-23 RX ORDER — MEDROXYPROGESTERONE ACETATE 150 MG/ML
150 INJECTION, SUSPENSION INTRAMUSCULAR
Qty: 1 ML | Refills: 3 | Status: SHIPPED | OUTPATIENT
Start: 2025-05-23

## 2025-05-23 ASSESSMENT — PAIN SCALES - GENERAL: PAINLEVEL_OUTOF10: 0-NO PAIN

## 2025-05-23 NOTE — PROGRESS NOTES
"Subjective   Patient ID: Rosenda Gaspar is a 45 y.o. female who presents for Follow-up (depo).    HPI     46 yo w/ PMHx of seaonal allergies who presents for depo.     # contraception   - on depo since 17 yo   - weight's been stable.   - denies side effects   - denies tobacco use.      Review of Systems  12 point ROS negative except as stated in HPI.     Objective   /71 (BP Location: Left arm, Patient Position: Sitting, BP Cuff Size: Adult)   Pulse 86   Temp 36.4 °C (97.6 °F) (Temporal)   Resp 16   Ht 1.702 m (5' 7\")   Wt 90.7 kg (200 lb)   SpO2 99%   BMI 31.32 kg/m²     Physical Exam  Gen: NAD, nontox  HEENT: NCAT. MMM.  RESP: no resp distress, talks in full sentences, CTABL  CVS: non-tachycardic, RRR  ABD: Soft, non-distended  Psych: appropriately answers questions. normal mood and affect  MSK: appears to have Full range of motion on all extremities.    Assessment/Plan   46 yo w/ no pertinent PMHx who presents for depo injection for contraception. Pt has been on depo since for about 30 years and has not had any side effects with it. Pt is within the window to obtain her injection and denies missed doses. No pregnancy obtained as pt has not missed doses. She was counseled on risk of osteoporosis with depo-provera.     Diagnoses and all orders for this visit:  Encounter for other contraceptive management  -     medroxyPROGESTERone 150 mg/mL injection; Inject 1 mL (150 mg) into the muscle every 12 weeks.    RTC in 3 months for annual follow up.     Seen and discussed with     Wilton Sawyer MD, MPH   Family Medicine and Preventive Health, PGY-3         "

## 2025-08-08 ENCOUNTER — APPOINTMENT (OUTPATIENT)
Dept: OBSTETRICS AND GYNECOLOGY | Facility: HOSPITAL | Age: 45
End: 2025-08-08
Payer: COMMERCIAL

## 2025-08-15 ENCOUNTER — CLINICAL SUPPORT (OUTPATIENT)
Facility: HOSPITAL | Age: 45
End: 2025-08-15
Payer: COMMERCIAL

## 2025-08-15 VITALS — DIASTOLIC BLOOD PRESSURE: 68 MMHG | HEART RATE: 92 BPM | SYSTOLIC BLOOD PRESSURE: 96 MMHG

## 2025-08-15 DIAGNOSIS — Z30.42 ENCOUNTER FOR SURVEILLANCE OF INJECTABLE CONTRACEPTIVE: ICD-10-CM

## 2025-08-15 LAB — PREGNANCY TEST URINE, POC: NEGATIVE

## 2025-08-15 PROCEDURE — 81025 URINE PREGNANCY TEST: CPT | Performed by: STUDENT IN AN ORGANIZED HEALTH CARE EDUCATION/TRAINING PROGRAM

## 2025-08-15 PROCEDURE — 81025 URINE PREGNANCY TEST: CPT

## 2025-08-15 PROCEDURE — 99211 OFF/OP EST MAY X REQ PHY/QHP: CPT

## 2025-08-15 PROCEDURE — 96372 THER/PROPH/DIAG INJ SC/IM: CPT | Performed by: STUDENT IN AN ORGANIZED HEALTH CARE EDUCATION/TRAINING PROGRAM

## 2025-08-15 PROCEDURE — 2500000004 HC RX 250 GENERAL PHARMACY W/ HCPCS (ALT 636 FOR OP/ED): Performed by: STUDENT IN AN ORGANIZED HEALTH CARE EDUCATION/TRAINING PROGRAM

## 2025-08-15 RX ORDER — MEDROXYPROGESTERONE ACETATE 150 MG/ML
150 INJECTION, SUSPENSION INTRAMUSCULAR ONCE
Status: COMPLETED | OUTPATIENT
Start: 2025-08-15 | End: 2025-08-15

## 2025-08-15 RX ADMIN — MEDROXYPROGESTERONE ACETATE 150 MG: 150 INJECTION, SUSPENSION INTRAMUSCULAR at 14:09

## 2025-08-29 ENCOUNTER — APPOINTMENT (OUTPATIENT)
Dept: PRIMARY CARE | Facility: CLINIC | Age: 45
End: 2025-08-29
Payer: COMMERCIAL

## 2025-08-29 VITALS
HEART RATE: 76 BPM | HEIGHT: 67 IN | SYSTOLIC BLOOD PRESSURE: 94 MMHG | DIASTOLIC BLOOD PRESSURE: 66 MMHG | OXYGEN SATURATION: 98 % | BODY MASS INDEX: 29.82 KG/M2 | TEMPERATURE: 97.5 F | WEIGHT: 190 LBS | RESPIRATION RATE: 20 BRPM

## 2025-08-29 DIAGNOSIS — Z00.00 ROUTINE GENERAL MEDICAL EXAMINATION AT A HEALTH CARE FACILITY: Primary | ICD-10-CM

## 2025-08-29 DIAGNOSIS — Z13.1 SCREENING FOR DIABETES MELLITUS: ICD-10-CM

## 2025-08-29 DIAGNOSIS — J45.998 POST VIRAL RAD (REACTIVE AIRWAY DISEASE) (HHS-HCC): ICD-10-CM

## 2025-08-29 DIAGNOSIS — Z11.59 NEED FOR HEPATITIS C SCREENING TEST: ICD-10-CM

## 2025-08-29 DIAGNOSIS — Z13.220 LIPID SCREENING: ICD-10-CM

## 2025-08-29 DIAGNOSIS — R53.83 FATIGUE, UNSPECIFIED TYPE: ICD-10-CM

## 2025-08-29 DIAGNOSIS — E55.9 VITAMIN D DEFICIENCY: ICD-10-CM

## 2025-08-29 DIAGNOSIS — E55.9 VITAMIN D DEFICIENCY DISEASE: ICD-10-CM

## 2025-08-29 DIAGNOSIS — Z13.29 SCREENING FOR THYROID DISORDER: ICD-10-CM

## 2025-08-29 DIAGNOSIS — Z12.11 ENCOUNTER FOR SCREENING FOR MALIGNANT NEOPLASM OF COLON: ICD-10-CM

## 2025-08-29 DIAGNOSIS — Z11.4 SCREENING FOR HUMAN IMMUNODEFICIENCY VIRUS: ICD-10-CM

## 2025-08-29 DIAGNOSIS — Z12.39 BREAST SCREENING: ICD-10-CM

## 2025-08-29 PROCEDURE — 99213 OFFICE O/P EST LOW 20 MIN: CPT

## 2025-08-29 PROCEDURE — 1036F TOBACCO NON-USER: CPT

## 2025-08-29 PROCEDURE — 3008F BODY MASS INDEX DOCD: CPT

## 2025-08-29 PROCEDURE — G8433 SCR FOR DEP NOT CPT DOC RSN: HCPCS

## 2025-08-29 PROCEDURE — 99396 PREV VISIT EST AGE 40-64: CPT

## 2025-08-29 RX ORDER — GUAIFENESIN 1200 MG/1
1200 TABLET, EXTENDED RELEASE ORAL AS NEEDED
COMMUNITY
Start: 2025-04-02 | End: 2025-08-29 | Stop reason: WASHOUT

## 2025-08-29 ASSESSMENT — ENCOUNTER SYMPTOMS
OCCASIONAL FEELINGS OF UNSTEADINESS: 0
DEPRESSION: 0
LOSS OF SENSATION IN FEET: 0

## 2025-08-29 ASSESSMENT — PAIN SCALES - GENERAL: PAINLEVEL_OUTOF10: 0-NO PAIN

## 2025-08-29 ASSESSMENT — COLUMBIA-SUICIDE SEVERITY RATING SCALE - C-SSRS
2. HAVE YOU ACTUALLY HAD ANY THOUGHTS OF KILLING YOURSELF?: NO
1. IN THE PAST MONTH, HAVE YOU WISHED YOU WERE DEAD OR WISHED YOU COULD GO TO SLEEP AND NOT WAKE UP?: NO
6. HAVE YOU EVER DONE ANYTHING, STARTED TO DO ANYTHING, OR PREPARED TO DO ANYTHING TO END YOUR LIFE?: NO

## 2025-08-29 ASSESSMENT — PATIENT HEALTH QUESTIONNAIRE - PHQ9
SUM OF ALL RESPONSES TO PHQ9 QUESTIONS 1 AND 2: 0
1. LITTLE INTEREST OR PLEASURE IN DOING THINGS: NOT AT ALL
2. FEELING DOWN, DEPRESSED OR HOPELESS: NOT AT ALL